# Patient Record
Sex: FEMALE | Race: WHITE | NOT HISPANIC OR LATINO | Employment: FULL TIME | URBAN - METROPOLITAN AREA
[De-identification: names, ages, dates, MRNs, and addresses within clinical notes are randomized per-mention and may not be internally consistent; named-entity substitution may affect disease eponyms.]

---

## 2017-02-01 ENCOUNTER — HISTORICAL (OUTPATIENT)
Dept: LAB | Facility: HOSPITAL | Age: 57
End: 2017-02-01

## 2017-10-10 ENCOUNTER — HISTORICAL (OUTPATIENT)
Dept: URGENT CARE | Facility: CLINIC | Age: 57
End: 2017-10-10

## 2017-10-10 LAB
BILIRUB SERPL-MCNC: NEGATIVE MG/DL
BLOOD URINE, POC: NEGATIVE
CLARITY, POC UA: CLEAR
COLOR, POC UA: YELLOW
GLUCOSE UR QL STRIP: NEGATIVE
KETONES UR QL STRIP: NEGATIVE
LEUKOCYTE EST, POC UA: NEGATIVE
NITRITE, POC UA: NEGATIVE
PH, POC UA: 5
PROTEIN, POC: NEGATIVE
SPECIFIC GRAVITY, POC UA: 1.01
UROBILINOGEN, POC UA: NORMAL

## 2017-10-12 LAB — FINAL CULTURE: NO GROWTH

## 2018-05-30 ENCOUNTER — HISTORICAL (OUTPATIENT)
Dept: LAB | Facility: HOSPITAL | Age: 58
End: 2018-05-30

## 2018-05-30 LAB
ABS NEUT (OLG): 5.05 X10(3)/MCL (ref 2.1–9.2)
ALBUMIN SERPL-MCNC: 4.1 GM/DL (ref 3.4–5)
ALBUMIN/GLOB SERPL: 1 RATIO (ref 1.1–2)
ALP SERPL-CCNC: 111 UNIT/L (ref 38–126)
ALT SERPL-CCNC: 35 UNIT/L (ref 12–78)
AST SERPL-CCNC: 23 UNIT/L (ref 15–37)
BASOPHILS # BLD AUTO: 0 X10(3)/MCL (ref 0–0.2)
BASOPHILS NFR BLD AUTO: 0 %
BILIRUB SERPL-MCNC: 0.5 MG/DL (ref 0.2–1)
BILIRUBIN DIRECT+TOT PNL SERPL-MCNC: 0.1 MG/DL (ref 0–0.5)
BILIRUBIN DIRECT+TOT PNL SERPL-MCNC: 0.4 MG/DL (ref 0–0.8)
BUN SERPL-MCNC: 17 MG/DL (ref 7–18)
CALCIUM SERPL-MCNC: 9.7 MG/DL (ref 8.5–10.1)
CHLORIDE SERPL-SCNC: 104 MMOL/L (ref 98–107)
CO2 SERPL-SCNC: 30 MMOL/L (ref 21–32)
CREAT SERPL-MCNC: 0.85 MG/DL (ref 0.55–1.02)
CRP SERPL HS-MCNC: 2.42 MG/L (ref 0–3)
EOSINOPHIL # BLD AUTO: 0.1 X10(3)/MCL (ref 0–0.9)
EOSINOPHIL NFR BLD AUTO: 1 %
ERYTHROCYTE [DISTWIDTH] IN BLOOD BY AUTOMATED COUNT: 12.8 % (ref 11.5–17)
ERYTHROCYTE [SEDIMENTATION RATE] IN BLOOD: 25 MM/HR (ref 0–20)
GLOBULIN SER-MCNC: 4 GM/DL (ref 2.4–3.5)
GLUCOSE SERPL-MCNC: 91 MG/DL (ref 74–106)
HCT VFR BLD AUTO: 43.6 % (ref 37–47)
HGB BLD-MCNC: 14.2 GM/DL (ref 12–16)
LYMPHOCYTES # BLD AUTO: 2.8 X10(3)/MCL (ref 0.6–4.6)
LYMPHOCYTES NFR BLD AUTO: 33 %
MCH RBC QN AUTO: 29.5 PG (ref 27–31)
MCHC RBC AUTO-ENTMCNC: 32.6 GM/DL (ref 33–36)
MCV RBC AUTO: 90.5 FL (ref 80–94)
MONOCYTES # BLD AUTO: 0.6 X10(3)/MCL (ref 0.1–1.3)
MONOCYTES NFR BLD AUTO: 7 %
NEUTROPHILS # BLD AUTO: 5.05 X10(3)/MCL (ref 1.4–7.9)
NEUTROPHILS NFR BLD AUTO: 59 %
PLATELET # BLD AUTO: 338 X10(3)/MCL (ref 130–400)
PMV BLD AUTO: 10.7 FL (ref 9.4–12.4)
POTASSIUM SERPL-SCNC: 4.3 MMOL/L (ref 3.5–5.1)
PROT SERPL-MCNC: 8.1 GM/DL (ref 6.4–8.2)
RBC # BLD AUTO: 4.82 X10(6)/MCL (ref 4.2–5.4)
SODIUM SERPL-SCNC: 139 MMOL/L (ref 136–145)
TSH SERPL-ACNC: 2.71 MIU/L (ref 0.36–3.74)
WBC # SPEC AUTO: 8.5 X10(3)/MCL (ref 4.5–11.5)

## 2018-06-28 ENCOUNTER — HISTORICAL (OUTPATIENT)
Dept: RADIOLOGY | Facility: HOSPITAL | Age: 58
End: 2018-06-28

## 2018-09-04 ENCOUNTER — HISTORICAL (OUTPATIENT)
Dept: LAB | Facility: HOSPITAL | Age: 58
End: 2018-09-04

## 2018-09-04 LAB
CHOLEST SERPL-MCNC: 220 MG/DL (ref 0–200)
CHOLEST/HDLC SERPL: 3.1 {RATIO} (ref 0–4)
GLUCOSE P FAST SERPL-MCNC: 80 MG/DL (ref 70–115)
HDLC SERPL-MCNC: 72 MG/DL (ref 35–60)
LDLC SERPL CALC-MCNC: 126 MG/DL (ref 0–129)
TRIGL SERPL-MCNC: 109 MG/DL (ref 30–150)
VLDLC SERPL CALC-MCNC: 22 MG/DL

## 2021-06-09 ENCOUNTER — HISTORICAL (OUTPATIENT)
Dept: LAB | Facility: HOSPITAL | Age: 61
End: 2021-06-09

## 2021-06-09 LAB
APPEARANCE, UA: ABNORMAL
BACTERIA SPEC CULT: ABNORMAL /HPF
BILIRUB UR QL STRIP: NEGATIVE
CHOLEST SERPL-MCNC: 170 MG/DL
CHOLEST/HDLC SERPL: 3 {RATIO} (ref 0–5)
COLOR UR: YELLOW
GLUCOSE (UA): NEGATIVE
HDLC SERPL-MCNC: 51 MG/DL (ref 40–60)
HGB UR QL STRIP: NEGATIVE
KETONES UR QL STRIP: NEGATIVE
LDLC SERPL CALC-MCNC: 98 MG/DL (ref 50–140)
LEUKOCYTE ESTERASE UR QL STRIP: ABNORMAL
NITRITE UR QL STRIP: NEGATIVE
PH UR STRIP: 7 [PH] (ref 5–7)
PROT UR QL STRIP: NEGATIVE
RBC #/AREA URNS HPF: 0 /[HPF]
SP GR UR STRIP: 1.01 (ref 1–1.03)
SQUAMOUS EPITHELIAL, UA: ABNORMAL /LPF
TRIGL SERPL-MCNC: 105 MG/DL (ref 0–150)
TSH SERPL-ACNC: 2.34 UIU/ML (ref 0.35–4.94)
UROBILINOGEN UR STRIP-ACNC: NEGATIVE
VLDLC SERPL CALC-MCNC: 21 MG/DL
WBC #/AREA URNS HPF: ABNORMAL /HPF

## 2021-06-11 LAB — FINAL CULTURE: NO GROWTH

## 2021-06-22 ENCOUNTER — HISTORICAL (OUTPATIENT)
Dept: LAB | Facility: HOSPITAL | Age: 61
End: 2021-06-22

## 2021-06-22 LAB
EST. AVERAGE GLUCOSE BLD GHB EST-MCNC: 99.7 MG/DL
HBA1C MFR BLD: 5.1 %

## 2021-07-07 ENCOUNTER — HISTORICAL (OUTPATIENT)
Dept: RADIOLOGY | Facility: HOSPITAL | Age: 61
End: 2021-07-07

## 2022-01-13 LAB — NONINV COLON CA DNA+OCC BLD SCRN STL QL: NEGATIVE

## 2022-04-11 ENCOUNTER — HISTORICAL (OUTPATIENT)
Dept: ADMINISTRATIVE | Facility: HOSPITAL | Age: 62
End: 2022-04-11

## 2022-04-29 VITALS
BODY MASS INDEX: 37.45 KG/M2 | HEIGHT: 64 IN | OXYGEN SATURATION: 98 % | WEIGHT: 219.38 LBS | SYSTOLIC BLOOD PRESSURE: 118 MMHG | DIASTOLIC BLOOD PRESSURE: 80 MMHG

## 2022-06-01 ENCOUNTER — TELEPHONE (OUTPATIENT)
Dept: FAMILY MEDICINE | Facility: CLINIC | Age: 62
End: 2022-06-01

## 2022-06-01 NOTE — TELEPHONE ENCOUNTER
Spoke to pt. She states that she went fishing last Saturday and got sunburned on bilat legs from the knees on down. She states that the legs are still very red and that she does have some swelling. She states that she is on Methotrexate and Plaquenil. She is wondering if maybe she had a reaction to the sun with her meds. Does pt need to be seen? Please advise.

## 2022-06-01 NOTE — TELEPHONE ENCOUNTER
----- Message from Tyson Sunshine sent at 6/1/2022 11:38 AM CDT -----  .Type:  Needs Medical Advice    Who Called: Jaye  Symptoms (please be specific):    How long has patient had these symptoms:    Pharmacy name and phone #:    Would the patient rather a call back or a response via [facility]?   Best Call Back Number: [phone]  Additional Information: She has questions for the nurse, she has gotten sunburn, pain has gone away but the skin is really red. She would like a call back.

## 2022-06-02 NOTE — TELEPHONE ENCOUNTER
Pt notified of Dr Garvin's recommendations. Verbal understanding given. She will call back if she feels she needs an appt.

## 2022-06-02 NOTE — TELEPHONE ENCOUNTER
It is possible that her reaction was more severe due to being on those medications. Would recommend symptomatic treatment with cooling methods and aloe vera gel. If it worsen, would recommend patient schedule an office visit for further evaluation.

## 2022-06-22 ENCOUNTER — OFFICE VISIT (OUTPATIENT)
Dept: FAMILY MEDICINE | Facility: CLINIC | Age: 62
End: 2022-06-22
Payer: COMMERCIAL

## 2022-06-22 VITALS
DIASTOLIC BLOOD PRESSURE: 83 MMHG | WEIGHT: 218 LBS | OXYGEN SATURATION: 97 % | HEART RATE: 74 BPM | TEMPERATURE: 98 F | BODY MASS INDEX: 38.62 KG/M2 | HEIGHT: 63 IN | SYSTOLIC BLOOD PRESSURE: 126 MMHG | RESPIRATION RATE: 18 BRPM

## 2022-06-22 DIAGNOSIS — E78.2 MIXED HYPERLIPIDEMIA: ICD-10-CM

## 2022-06-22 DIAGNOSIS — I49.9 CARDIAC ARRHYTHMIA, UNSPECIFIED CARDIAC ARRHYTHMIA TYPE: ICD-10-CM

## 2022-06-22 DIAGNOSIS — Z00.00 ANNUAL PHYSICAL EXAM: Primary | ICD-10-CM

## 2022-06-22 DIAGNOSIS — Z12.31 ENCOUNTER FOR SCREENING MAMMOGRAM FOR MALIGNANT NEOPLASM OF BREAST: ICD-10-CM

## 2022-06-22 DIAGNOSIS — Z23 NEED FOR PNEUMOCOCCAL VACCINE: ICD-10-CM

## 2022-06-22 DIAGNOSIS — M79.89 SWELLING OF LOWER EXTREMITY: ICD-10-CM

## 2022-06-22 DIAGNOSIS — M19.90 OSTEOARTHRITIS, UNSPECIFIED OSTEOARTHRITIS TYPE, UNSPECIFIED SITE: ICD-10-CM

## 2022-06-22 DIAGNOSIS — I34.1 MITRAL VALVE PROLAPSE: ICD-10-CM

## 2022-06-22 DIAGNOSIS — M06.9 RHEUMATOID ARTHRITIS, INVOLVING UNSPECIFIED SITE, UNSPECIFIED WHETHER RHEUMATOID FACTOR PRESENT: ICD-10-CM

## 2022-06-22 PROCEDURE — 3008F PR BODY MASS INDEX (BMI) DOCUMENTED: ICD-10-PCS | Mod: CPTII,,, | Performed by: STUDENT IN AN ORGANIZED HEALTH CARE EDUCATION/TRAINING PROGRAM

## 2022-06-22 PROCEDURE — 99396 PR PREVENTIVE VISIT,EST,40-64: ICD-10-PCS | Mod: 25,,, | Performed by: STUDENT IN AN ORGANIZED HEALTH CARE EDUCATION/TRAINING PROGRAM

## 2022-06-22 PROCEDURE — 90677 PNEUMOCOCCAL CONJUGATE VACCINE 20-VALENT: ICD-10-PCS | Mod: ,,, | Performed by: STUDENT IN AN ORGANIZED HEALTH CARE EDUCATION/TRAINING PROGRAM

## 2022-06-22 PROCEDURE — 90677 PCV20 VACCINE IM: CPT | Mod: ,,, | Performed by: STUDENT IN AN ORGANIZED HEALTH CARE EDUCATION/TRAINING PROGRAM

## 2022-06-22 PROCEDURE — 1159F MED LIST DOCD IN RCRD: CPT | Mod: CPTII,,, | Performed by: STUDENT IN AN ORGANIZED HEALTH CARE EDUCATION/TRAINING PROGRAM

## 2022-06-22 PROCEDURE — 3074F PR MOST RECENT SYSTOLIC BLOOD PRESSURE < 130 MM HG: ICD-10-PCS | Mod: CPTII,,, | Performed by: STUDENT IN AN ORGANIZED HEALTH CARE EDUCATION/TRAINING PROGRAM

## 2022-06-22 PROCEDURE — 90471 PNEUMOCOCCAL CONJUGATE VACCINE 20-VALENT: ICD-10-PCS | Mod: ,,, | Performed by: STUDENT IN AN ORGANIZED HEALTH CARE EDUCATION/TRAINING PROGRAM

## 2022-06-22 PROCEDURE — 3079F PR MOST RECENT DIASTOLIC BLOOD PRESSURE 80-89 MM HG: ICD-10-PCS | Mod: CPTII,,, | Performed by: STUDENT IN AN ORGANIZED HEALTH CARE EDUCATION/TRAINING PROGRAM

## 2022-06-22 PROCEDURE — 3074F SYST BP LT 130 MM HG: CPT | Mod: CPTII,,, | Performed by: STUDENT IN AN ORGANIZED HEALTH CARE EDUCATION/TRAINING PROGRAM

## 2022-06-22 PROCEDURE — 1159F PR MEDICATION LIST DOCUMENTED IN MEDICAL RECORD: ICD-10-PCS | Mod: CPTII,,, | Performed by: STUDENT IN AN ORGANIZED HEALTH CARE EDUCATION/TRAINING PROGRAM

## 2022-06-22 PROCEDURE — 90471 IMMUNIZATION ADMIN: CPT | Mod: ,,, | Performed by: STUDENT IN AN ORGANIZED HEALTH CARE EDUCATION/TRAINING PROGRAM

## 2022-06-22 PROCEDURE — 3079F DIAST BP 80-89 MM HG: CPT | Mod: CPTII,,, | Performed by: STUDENT IN AN ORGANIZED HEALTH CARE EDUCATION/TRAINING PROGRAM

## 2022-06-22 PROCEDURE — 3008F BODY MASS INDEX DOCD: CPT | Mod: CPTII,,, | Performed by: STUDENT IN AN ORGANIZED HEALTH CARE EDUCATION/TRAINING PROGRAM

## 2022-06-22 PROCEDURE — 99396 PREV VISIT EST AGE 40-64: CPT | Mod: 25,,, | Performed by: STUDENT IN AN ORGANIZED HEALTH CARE EDUCATION/TRAINING PROGRAM

## 2022-06-22 RX ORDER — MELOXICAM 15 MG/1
15 TABLET ORAL DAILY
COMMUNITY
Start: 2021-06-03 | End: 2023-06-26

## 2022-06-22 RX ORDER — FOLIC ACID 1 MG/1
1000 TABLET ORAL DAILY
COMMUNITY
Start: 2022-04-14

## 2022-06-22 RX ORDER — HYDROCHLOROTHIAZIDE 25 MG/1
25 TABLET ORAL DAILY
COMMUNITY

## 2022-06-22 RX ORDER — HYDROXYCHLOROQUINE SULFATE 200 MG/1
200 TABLET, FILM COATED ORAL 2 TIMES DAILY
COMMUNITY
Start: 2022-05-19

## 2022-06-22 RX ORDER — OMEPRAZOLE 20 MG/1
20 CAPSULE, DELAYED RELEASE ORAL EVERY MORNING
COMMUNITY
Start: 2022-03-07

## 2022-06-22 RX ORDER — METOPROLOL SUCCINATE 100 MG/1
100 TABLET, EXTENDED RELEASE ORAL DAILY
COMMUNITY
Start: 2022-04-14

## 2022-06-22 RX ORDER — METHOTREXATE 25 MG/ML
0.8 INJECTION, SOLUTION INTRA-ARTERIAL; INTRAMUSCULAR; INTRAVENOUS
COMMUNITY
Start: 2022-05-28

## 2022-06-22 NOTE — ASSESSMENT & PLAN NOTE
- Patient following with Dr. Jenny Rice with Rheumatology.  - Patient taking folic acid, Plaquenil, Mobic PRN, Methotrexate weekly, and Prednisone per Rheumatology. She takes Prednisone when she has any flare-ups.

## 2022-06-22 NOTE — PROGRESS NOTES
"Subjective:      Patient ID: Jaye Tinajero is a 62 y.o.  female.    Chief Complaint: Wellness    Preventative Health: Pap smear from 06/22/21 negative. Mammogram from 07/07/21 negative. Cologuard from 01/04/22 negative.    RA/OA: Patient following with Dr. Jenny Rice with Rheumatology. Patient taking folic acid, Plaquenil, Mobic PRN, Methotrexate weekly, and Prednisone per Rheumatology. She takes Prednisone when she has any flare-ups.    Mitral Valve Prolapse/Arrhythmia: Patient following Dr. Chemo Barraza with Cardiology. She is taking Metoprolol daily. Patient denies any chest pain, shortness of breath, or any syncopal episodes. She endorses palpitations, "here and there," but states she's been doing well overall.    Lower Extremity Swelling: Patient takes HCTZ PRN, maybe 1-2 times a month.    Review of Systems   Constitutional: Negative for activity change, fatigue and fever.   Respiratory: Negative for apnea, cough and shortness of breath.    Cardiovascular: Positive for palpitations and leg swelling. Negative for chest pain.   Gastrointestinal: Negative for abdominal pain, diarrhea and nausea.   Musculoskeletal: Positive for arthralgias.   Skin: Negative for rash and wound.   Neurological: Negative for dizziness, weakness, numbness and headaches.   Psychiatric/Behavioral: Negative for behavioral problems.     Objective:   /83 (BP Location: Right arm)   Pulse 74   Temp 98.1 °F (36.7 °C)   Resp 18   Ht 5' 3" (1.6 m)   Wt 98.9 kg (218 lb)   SpO2 97%   BMI 38.62 kg/m²     Physical Exam  Vitals and nursing note reviewed.   Constitutional:       General: She is not in acute distress.     Appearance: Normal appearance. She is not ill-appearing or toxic-appearing.   HENT:      Head: Normocephalic and atraumatic.      Mouth/Throat:      Mouth: Mucous membranes are moist.      Pharynx: Oropharynx is clear.   Cardiovascular:      Rate and Rhythm: Normal rate and regular rhythm.      Heart sounds: " "Normal heart sounds. No murmur heard.  Pulmonary:      Effort: Pulmonary effort is normal. No respiratory distress.      Breath sounds: Normal breath sounds.   Abdominal:      General: There is no distension.      Palpations: Abdomen is soft.      Tenderness: There is no abdominal tenderness.   Musculoskeletal:         General: Normal range of motion.      Right lower leg: No edema.      Left lower leg: No edema.   Skin:     General: Skin is warm and dry.      Findings: No lesion or rash.   Neurological:      General: No focal deficit present.      Mental Status: She is alert. Mental status is at baseline.   Psychiatric:         Mood and Affect: Mood normal.         Behavior: Behavior normal.         Thought Content: Thought content normal.         Judgment: Judgment normal.       Assessment:     1. Annual physical exam    2. Need for pneumococcal vaccine    3. Encounter for screening mammogram for malignant neoplasm of breast    4. Rheumatoid arthritis, involving unspecified site, unspecified whether rheumatoid factor present    5. Osteoarthritis, unspecified osteoarthritis type, unspecified site    6. Mitral valve prolapse    7. Cardiac arrhythmia, unspecified cardiac arrhythmia type    8. Swelling of lower extremity    9. Mixed hyperlipidemia      Plan:     Problem List Items Addressed This Visit        Cardiac/Vascular    Cardiac arrhythmia     - Refer to Mitral valve prolapse plan.             Mitral valve prolapse     - Patient following Dr. Chemo Barraza with Cardiology.  - She is taking Metoprolol daily.  - Patient denies any chest pain, shortness of breath, or any syncopal episodes.  - She endorses palpitations, "here and there," but states she's been doing well overall.             Mixed hyperlipidemia     - Lipid panel for routine monitoring.           Relevant Orders    Lipid Panel       Immunology/Multi System    Rheumatoid arthritis     - Patient following with Dr. Jenny Rice with Rheumatology.  - " Patient taking folic acid, Plaquenil, Mobic PRN, Methotrexate weekly, and Prednisone per Rheumatology. She takes Prednisone when she has any flare-ups.                Orthopedic    Osteoarthritis     - Refer to RA plan.              Other    Swelling of lower extremity     - Patient takes HCTZ PRN, maybe 1-2 times a month.               Other Visit Diagnoses     Annual physical exam    -  Primary    Relevant Orders    Pneumococcal Conjugate Vaccine (20 Valent) (IM)    Mammo Digital Screening Bilat    Need for pneumococcal vaccine        Relevant Orders    Pneumococcal Conjugate Vaccine (20 Valent) (IM)    Encounter for screening mammogram for malignant neoplasm of breast        Relevant Orders    Mammo Digital Screening Bilat

## 2022-06-22 NOTE — ASSESSMENT & PLAN NOTE
"- Patient following Dr. Chemo Barraza with Cardiology.  - She is taking Metoprolol daily.  - Patient denies any chest pain, shortness of breath, or any syncopal episodes.  - She endorses palpitations, "here and there," but states she's been doing well overall.    "

## 2022-06-29 ENCOUNTER — DOCUMENTATION ONLY (OUTPATIENT)
Dept: ADMINISTRATIVE | Facility: HOSPITAL | Age: 62
End: 2022-06-29
Payer: COMMERCIAL

## 2022-07-27 ENCOUNTER — LAB VISIT (OUTPATIENT)
Dept: LAB | Facility: HOSPITAL | Age: 62
End: 2022-07-27
Attending: STUDENT IN AN ORGANIZED HEALTH CARE EDUCATION/TRAINING PROGRAM
Payer: COMMERCIAL

## 2022-07-27 DIAGNOSIS — E78.2 MIXED HYPERLIPIDEMIA: ICD-10-CM

## 2022-07-27 LAB
CHOLEST SERPL-MCNC: 178 MG/DL
CHOLEST/HDLC SERPL: 3 {RATIO} (ref 0–5)
HDLC SERPL-MCNC: 58 MG/DL (ref 35–60)
LDLC SERPL CALC-MCNC: 104 MG/DL (ref 50–140)
TRIGL SERPL-MCNC: 81 MG/DL (ref 37–140)
VLDLC SERPL CALC-MCNC: 16 MG/DL

## 2022-07-27 PROCEDURE — 36415 COLL VENOUS BLD VENIPUNCTURE: CPT

## 2022-07-27 PROCEDURE — 80061 LIPID PANEL: CPT

## 2022-07-28 ENCOUNTER — TELEPHONE (OUTPATIENT)
Dept: FAMILY MEDICINE | Facility: CLINIC | Age: 62
End: 2022-07-28
Payer: COMMERCIAL

## 2022-09-20 ENCOUNTER — HOSPITAL ENCOUNTER (OUTPATIENT)
Dept: RADIOLOGY | Facility: HOSPITAL | Age: 62
Discharge: HOME | End: 2022-09-20
Attending: STUDENT IN AN ORGANIZED HEALTH CARE EDUCATION/TRAINING PROGRAM
Payer: COMMERCIAL

## 2022-09-20 DIAGNOSIS — Z00.00 ANNUAL PHYSICAL EXAM: ICD-10-CM

## 2022-09-20 DIAGNOSIS — Z12.31 ENCOUNTER FOR SCREENING MAMMOGRAM FOR MALIGNANT NEOPLASM OF BREAST: ICD-10-CM

## 2022-09-20 PROCEDURE — 77067 SCR MAMMO BI INCL CAD: CPT | Mod: 26,,, | Performed by: STUDENT IN AN ORGANIZED HEALTH CARE EDUCATION/TRAINING PROGRAM

## 2022-09-20 PROCEDURE — 77067 SCR MAMMO BI INCL CAD: CPT | Mod: TC

## 2022-09-20 PROCEDURE — 77067 MAMMO DIGITAL SCREENING BILAT WITH TOMO: ICD-10-PCS | Mod: 26,,, | Performed by: STUDENT IN AN ORGANIZED HEALTH CARE EDUCATION/TRAINING PROGRAM

## 2022-09-20 PROCEDURE — 77063 MAMMO DIGITAL SCREENING BILAT WITH TOMO: ICD-10-PCS | Mod: 26,,, | Performed by: STUDENT IN AN ORGANIZED HEALTH CARE EDUCATION/TRAINING PROGRAM

## 2022-09-20 PROCEDURE — 77063 BREAST TOMOSYNTHESIS BI: CPT | Mod: 26,,, | Performed by: STUDENT IN AN ORGANIZED HEALTH CARE EDUCATION/TRAINING PROGRAM

## 2022-09-22 ENCOUNTER — HISTORICAL (OUTPATIENT)
Dept: ADMINISTRATIVE | Facility: HOSPITAL | Age: 62
End: 2022-09-22
Payer: COMMERCIAL

## 2022-09-26 ENCOUNTER — TELEPHONE (OUTPATIENT)
Dept: FAMILY MEDICINE | Facility: CLINIC | Age: 62
End: 2022-09-26
Payer: COMMERCIAL

## 2022-09-26 NOTE — TELEPHONE ENCOUNTER
----- Message from Violetta Mile sent at 9/26/2022  8:53 AM CDT -----  Regarding: call back  .Type:  Needs Medical Advice    Who Called: pt   Symptoms (please be specific): \   How long has patient had these symptoms:  \  Pharmacy name and phone #:    Would the patient rather a call back or a response via [facility]?   Best Call Back Number: [phone]  Additional Information: pt twisted her knee and would like to know if she needs to go to a walk in clinic or if she can get an x-ray done in the clinic.

## 2022-09-26 NOTE — TELEPHONE ENCOUNTER
Called pt to find out which knee it was. She states it was the left but also states that she was going to go to [facility] urgent care. I advised her to have urgent care send the visit note to our office. Verbal understanding given.

## 2023-06-22 ENCOUNTER — TELEPHONE (OUTPATIENT)
Dept: FAMILY MEDICINE | Facility: CLINIC | Age: 63
End: 2023-06-22
Payer: COMMERCIAL

## 2023-06-22 DIAGNOSIS — E78.2 MIXED HYPERLIPIDEMIA: Primary | ICD-10-CM

## 2023-06-22 DIAGNOSIS — Z00.00 ANNUAL PHYSICAL EXAM: ICD-10-CM

## 2023-06-22 DIAGNOSIS — Z11.59 ENCOUNTER FOR HEPATITIS C SCREENING TEST FOR LOW RISK PATIENT: ICD-10-CM

## 2023-06-22 DIAGNOSIS — Z11.4 ENCOUNTER FOR SCREENING FOR HIV: ICD-10-CM

## 2023-06-22 NOTE — TELEPHONE ENCOUNTER
----- Message from Salome Amato MA sent at 6/22/2023  2:17 PM CDT -----  Regarding: FW: lab orders  Please advise  ----- Message -----  From: aDra Loya  Sent: 6/22/2023   2:14 PM CDT  To: [facility] Staff  Subject: lab orders                                       .Caller is requesting to schedule their Lab appointment prior to annual appointment.  Order is not listed in EPIC.  Please enter order and contact patient to schedule.    Name of Caller: patient    Preferred Date and Time of Labs:    Date of [facility] Appointment: 6/26    Where would they like the lab performed?    Would the patient rather a call back or a response via [facility]?  Call back    Best Call Back Number: [phone]    Additional Information: patient would like lab orders to be placed in system for upcoming wellness on 6/26. Please contact patient in regards to if the labs can be done same day or 2-3 days before.

## 2023-06-22 NOTE — TELEPHONE ENCOUNTER
I have ordered the following labs. Please notify the patient.    Orders Placed This Encounter   Procedures    CBC Without Differential     Standing Status:   Future     Standing Expiration Date:   8/20/2024    HIV 1/2 Ag/Ab (4th Gen)     Standing Status:   Future     Standing Expiration Date:   8/20/2024     Order Specific Question:   Release to patient     Answer:   Immediate    Hepatitis C Antibody     Standing Status:   Future     Standing Expiration Date:   8/20/2024     Order Specific Question:   Release to patient     Answer:   Immediate    Lipid Panel     Standing Status:   Future     Standing Expiration Date:   9/22/2023    Basic Metabolic Panel     Standing Status:   Future     Standing Expiration Date:   9/22/2023

## 2023-06-23 ENCOUNTER — LAB VISIT (OUTPATIENT)
Dept: LAB | Facility: HOSPITAL | Age: 63
End: 2023-06-23
Attending: STUDENT IN AN ORGANIZED HEALTH CARE EDUCATION/TRAINING PROGRAM
Payer: COMMERCIAL

## 2023-06-23 DIAGNOSIS — Z11.59 ENCOUNTER FOR HEPATITIS C SCREENING TEST FOR LOW RISK PATIENT: ICD-10-CM

## 2023-06-23 DIAGNOSIS — E78.2 MIXED HYPERLIPIDEMIA: ICD-10-CM

## 2023-06-23 DIAGNOSIS — Z11.4 ENCOUNTER FOR SCREENING FOR HIV: ICD-10-CM

## 2023-06-23 DIAGNOSIS — Z00.00 ANNUAL PHYSICAL EXAM: ICD-10-CM

## 2023-06-23 LAB
ANION GAP SERPL CALC-SCNC: 8 MEQ/L
BUN SERPL-MCNC: 13.2 MG/DL (ref 9.8–20.1)
CALCIUM SERPL-MCNC: 9.9 MG/DL (ref 8.4–10.2)
CHLORIDE SERPL-SCNC: 110 MMOL/L (ref 98–107)
CHOLEST SERPL-MCNC: 159 MG/DL
CHOLEST/HDLC SERPL: 3 {RATIO} (ref 0–5)
CO2 SERPL-SCNC: 25 MMOL/L (ref 23–31)
CREAT SERPL-MCNC: 0.78 MG/DL (ref 0.55–1.02)
CREAT/UREA NIT SERPL: 17
ERYTHROCYTE [DISTWIDTH] IN BLOOD BY AUTOMATED COUNT: 13.2 % (ref 11.5–17)
GFR SERPLBLD CREATININE-BSD FMLA CKD-EPI: >60 MLS/MIN/1.73/M2
GLUCOSE SERPL-MCNC: 95 MG/DL (ref 82–115)
HCT VFR BLD AUTO: 39.7 % (ref 37–47)
HCV AB SERPL QL IA: NONREACTIVE
HDLC SERPL-MCNC: 48 MG/DL (ref 35–60)
HGB BLD-MCNC: 13.3 G/DL (ref 12–16)
HIV 1+2 AB+HIV1 P24 AG SERPL QL IA: NONREACTIVE
LDLC SERPL CALC-MCNC: 93 MG/DL (ref 50–140)
MCH RBC QN AUTO: 31.2 PG (ref 27–31)
MCHC RBC AUTO-ENTMCNC: 33.5 G/DL (ref 33–36)
MCV RBC AUTO: 93.2 FL (ref 80–94)
NRBC BLD AUTO-RTO: 0 %
PLATELET # BLD AUTO: 272 X10(3)/MCL (ref 130–400)
PMV BLD AUTO: 10.1 FL (ref 7.4–10.4)
POTASSIUM SERPL-SCNC: 3.8 MMOL/L (ref 3.5–5.1)
RBC # BLD AUTO: 4.26 X10(6)/MCL (ref 4.2–5.4)
SODIUM SERPL-SCNC: 143 MMOL/L (ref 136–145)
TRIGL SERPL-MCNC: 89 MG/DL (ref 37–140)
VLDLC SERPL CALC-MCNC: 18 MG/DL
WBC # SPEC AUTO: 5.87 X10(3)/MCL (ref 4.5–11.5)

## 2023-06-23 PROCEDURE — 36415 COLL VENOUS BLD VENIPUNCTURE: CPT

## 2023-06-23 PROCEDURE — 80061 LIPID PANEL: CPT

## 2023-06-23 PROCEDURE — 87389 HIV-1 AG W/HIV-1&-2 AB AG IA: CPT

## 2023-06-23 PROCEDURE — 86803 HEPATITIS C AB TEST: CPT

## 2023-06-23 PROCEDURE — 85027 COMPLETE CBC AUTOMATED: CPT

## 2023-06-23 PROCEDURE — 80048 BASIC METABOLIC PNL TOTAL CA: CPT

## 2023-06-26 ENCOUNTER — OFFICE VISIT (OUTPATIENT)
Dept: FAMILY MEDICINE | Facility: CLINIC | Age: 63
End: 2023-06-26
Payer: COMMERCIAL

## 2023-06-26 ENCOUNTER — LAB VISIT (OUTPATIENT)
Dept: LAB | Facility: HOSPITAL | Age: 63
End: 2023-06-26
Attending: STUDENT IN AN ORGANIZED HEALTH CARE EDUCATION/TRAINING PROGRAM
Payer: COMMERCIAL

## 2023-06-26 VITALS
HEART RATE: 66 BPM | OXYGEN SATURATION: 96 % | HEIGHT: 63 IN | BODY MASS INDEX: 38.15 KG/M2 | RESPIRATION RATE: 18 BRPM | SYSTOLIC BLOOD PRESSURE: 116 MMHG | WEIGHT: 215.31 LBS | DIASTOLIC BLOOD PRESSURE: 78 MMHG | TEMPERATURE: 98 F

## 2023-06-26 DIAGNOSIS — I34.1 MITRAL VALVE PROLAPSE: ICD-10-CM

## 2023-06-26 DIAGNOSIS — I49.9 CARDIAC ARRHYTHMIA, UNSPECIFIED CARDIAC ARRHYTHMIA TYPE: ICD-10-CM

## 2023-06-26 DIAGNOSIS — R53.83 FATIGUE, UNSPECIFIED TYPE: ICD-10-CM

## 2023-06-26 DIAGNOSIS — M79.89 SWELLING OF LOWER EXTREMITY: ICD-10-CM

## 2023-06-26 DIAGNOSIS — L40.50 PSORIATIC ARTHRITIS: ICD-10-CM

## 2023-06-26 DIAGNOSIS — Z00.00 ANNUAL PHYSICAL EXAM: Primary | ICD-10-CM

## 2023-06-26 DIAGNOSIS — E78.2 MIXED HYPERLIPIDEMIA: ICD-10-CM

## 2023-06-26 DIAGNOSIS — M06.9 RHEUMATOID ARTHRITIS, INVOLVING UNSPECIFIED SITE, UNSPECIFIED WHETHER RHEUMATOID FACTOR PRESENT: ICD-10-CM

## 2023-06-26 DIAGNOSIS — M19.90 OSTEOARTHRITIS, UNSPECIFIED OSTEOARTHRITIS TYPE, UNSPECIFIED SITE: ICD-10-CM

## 2023-06-26 DIAGNOSIS — E66.01 SEVERE OBESITY (BMI 35.0-39.9) WITH COMORBIDITY: ICD-10-CM

## 2023-06-26 PROBLEM — I47.10 SUPRAVENTRICULAR TACHYCARDIA: Status: RESOLVED | Noted: 2023-06-26 | Resolved: 2023-06-26

## 2023-06-26 PROBLEM — I47.10 SUPRAVENTRICULAR TACHYCARDIA: Status: ACTIVE | Noted: 2023-06-26

## 2023-06-26 LAB
DEPRECATED CALCIDIOL+CALCIFEROL SERPL-MC: 44.2 NG/ML (ref 30–80)
FOLATE SERPL-MCNC: 60.4 NG/ML (ref 7–31.4)
TSH SERPL-ACNC: 2.33 UIU/ML (ref 0.35–4.94)
VIT B12 SERPL-MCNC: 686 PG/ML (ref 213–816)

## 2023-06-26 PROCEDURE — 1159F MED LIST DOCD IN RCRD: CPT | Mod: CPTII,,, | Performed by: STUDENT IN AN ORGANIZED HEALTH CARE EDUCATION/TRAINING PROGRAM

## 2023-06-26 PROCEDURE — 99396 PR PREVENTIVE VISIT,EST,40-64: ICD-10-PCS | Mod: ,,, | Performed by: STUDENT IN AN ORGANIZED HEALTH CARE EDUCATION/TRAINING PROGRAM

## 2023-06-26 PROCEDURE — 3008F PR BODY MASS INDEX (BMI) DOCUMENTED: ICD-10-PCS | Mod: CPTII,,, | Performed by: STUDENT IN AN ORGANIZED HEALTH CARE EDUCATION/TRAINING PROGRAM

## 2023-06-26 PROCEDURE — 36415 COLL VENOUS BLD VENIPUNCTURE: CPT

## 2023-06-26 PROCEDURE — 82306 VITAMIN D 25 HYDROXY: CPT

## 2023-06-26 PROCEDURE — 3074F PR MOST RECENT SYSTOLIC BLOOD PRESSURE < 130 MM HG: ICD-10-PCS | Mod: CPTII,,, | Performed by: STUDENT IN AN ORGANIZED HEALTH CARE EDUCATION/TRAINING PROGRAM

## 2023-06-26 PROCEDURE — 3078F PR MOST RECENT DIASTOLIC BLOOD PRESSURE < 80 MM HG: ICD-10-PCS | Mod: CPTII,,, | Performed by: STUDENT IN AN ORGANIZED HEALTH CARE EDUCATION/TRAINING PROGRAM

## 2023-06-26 PROCEDURE — 82746 ASSAY OF FOLIC ACID SERUM: CPT

## 2023-06-26 PROCEDURE — 84443 ASSAY THYROID STIM HORMONE: CPT

## 2023-06-26 PROCEDURE — 1159F PR MEDICATION LIST DOCUMENTED IN MEDICAL RECORD: ICD-10-PCS | Mod: CPTII,,, | Performed by: STUDENT IN AN ORGANIZED HEALTH CARE EDUCATION/TRAINING PROGRAM

## 2023-06-26 PROCEDURE — 3008F BODY MASS INDEX DOCD: CPT | Mod: CPTII,,, | Performed by: STUDENT IN AN ORGANIZED HEALTH CARE EDUCATION/TRAINING PROGRAM

## 2023-06-26 PROCEDURE — 3078F DIAST BP <80 MM HG: CPT | Mod: CPTII,,, | Performed by: STUDENT IN AN ORGANIZED HEALTH CARE EDUCATION/TRAINING PROGRAM

## 2023-06-26 PROCEDURE — 82607 VITAMIN B-12: CPT

## 2023-06-26 PROCEDURE — 99396 PREV VISIT EST AGE 40-64: CPT | Mod: ,,, | Performed by: STUDENT IN AN ORGANIZED HEALTH CARE EDUCATION/TRAINING PROGRAM

## 2023-06-26 PROCEDURE — 3074F SYST BP LT 130 MM HG: CPT | Mod: CPTII,,, | Performed by: STUDENT IN AN ORGANIZED HEALTH CARE EDUCATION/TRAINING PROGRAM

## 2023-06-26 NOTE — ASSESSMENT & PLAN NOTE
"- Patient following Dr. Chemo Barraza with Cardiology yearly.  - She is taking Metoprolol daily.  - Patient denies any chest pain, shortness of breath, or any syncopal episodes.  - She endorses palpitations, "here and there," but states she's been doing well overall.    "

## 2023-06-26 NOTE — PROGRESS NOTES
"Subjective:      Patient ID: Jaye Tinajero is a 63 y.o.  female.    Chief Complaint: Wellness    Preventative Health: CBC overall negative. BMP overall negative. Lipid panel negative. Hepatitis C Antibody negative. HIV negative. Pap smear UTD. Mammogram negative from 09/20/22. Cologuard negative from 01/04/22.    RA/Psoriatic Arthritis/OA: Patient following with Dr. Jenny Rice with Rheumatology. Patient taking folic acid, Plaquenil, and Methotrexate weekly. She was just started on Skyrizi as well.    Mitral Valve Prolapse/Arrhythmia: Patient following Dr. Chemo Barraza with Cardiology yearly. She is taking Metoprolol daily. Patient denies any chest pain, shortness of breath, or any syncopal episodes. She endorses palpitations, "here and there," but states she's been doing well overall.    Lower Extremity Swelling: Patient takes HCTZ PRN.    Fatigue: This is ongoing, but patient states this year it's just gotten worse.    Review of Systems   Constitutional:  Positive for fatigue. Negative for activity change, appetite change, fever and unexpected weight change.   Eyes:  Negative for visual disturbance.   Respiratory:  Negative for apnea, cough and shortness of breath.    Cardiovascular:  Positive for palpitations and leg swelling. Negative for chest pain.   Gastrointestinal:  Negative for abdominal pain, blood in stool, constipation, diarrhea, nausea and vomiting.   Genitourinary:  Negative for dysuria and hematuria.   Musculoskeletal:  Positive for arthralgias and joint swelling. Negative for back pain and myalgias.   Skin:  Negative for rash and wound.   Neurological:  Negative for dizziness, weakness, numbness and headaches.   Psychiatric/Behavioral:  Negative for behavioral problems, dysphoric mood and sleep disturbance. The patient is not nervous/anxious.      Objective:   /78 (BP Location: Right arm, Patient Position: Sitting, BP Method: Large (Automatic))   Pulse 66   Temp 98.2 °F (36.8 °C) " "(Temporal)   Resp 18   Ht 5' 3" (1.6 m)   Wt 97.7 kg (215 lb 4.8 oz)   SpO2 96%   BMI 38.14 kg/m²     Physical Exam  Vitals and nursing note reviewed.   Constitutional:       General: She is not in acute distress.     Appearance: Normal appearance. She is not ill-appearing or toxic-appearing.   HENT:      Head: Normocephalic and atraumatic.      Mouth/Throat:      Mouth: Mucous membranes are moist.      Pharynx: Oropharynx is clear.   Eyes:      Conjunctiva/sclera: Conjunctivae normal.   Cardiovascular:      Rate and Rhythm: Normal rate and regular rhythm.      Heart sounds: Normal heart sounds. No murmur heard.  Pulmonary:      Effort: Pulmonary effort is normal. No respiratory distress.      Breath sounds: Normal breath sounds.   Abdominal:      General: There is no distension.      Palpations: Abdomen is soft.      Tenderness: There is no abdominal tenderness.   Musculoskeletal:         General: Swelling (bilateral fingers) present. No tenderness, deformity or signs of injury. Normal range of motion.      Cervical back: Neck supple. No tenderness.      Right lower leg: No edema.      Left lower leg: No edema.   Lymphadenopathy:      Cervical: No cervical adenopathy.   Skin:     General: Skin is warm and dry.      Findings: No lesion or rash.   Neurological:      General: No focal deficit present.      Mental Status: She is alert. Mental status is at baseline.      Motor: No weakness.      Coordination: Coordination normal.   Psychiatric:         Mood and Affect: Mood normal.         Behavior: Behavior normal.         Thought Content: Thought content normal.         Judgment: Judgment normal.     Assessment/Plan:   1. Annual physical exam  Comments:  - Lab results reviewed with patient.  - Pap smear UTD.   - Mammogram negative from 09/20/22.   - Cologuard negative from 01/04/22.    2. Rheumatoid arthritis, involving unspecified site, unspecified whether rheumatoid factor present  Assessment & Plan:  - Patient " "following with Dr. Jenny Rice with Rheumatology.  - Patient taking folic acid, Plaquenil, and Methotrexate weekly. She was just started on Skyrizi as well.        3. Psoriatic arthritis  Assessment & Plan:  - Refer to RA plan.      4. Osteoarthritis, unspecified osteoarthritis type, unspecified site  Assessment & Plan:  - Refer to RA plan.      5. Mitral valve prolapse  Assessment & Plan:  - Patient following Dr. Chemo Barraza with Cardiology yearly.  - She is taking Metoprolol daily.  - Patient denies any chest pain, shortness of breath, or any syncopal episodes.  - She endorses palpitations, "here and there," but states she's been doing well overall.        6. Cardiac arrhythmia, unspecified cardiac arrhythmia type  Comments:  - Refer to Mitral valve prolapse plan.    7. Swelling of lower extremity  Assessment & Plan:  - Patient takes HCTZ PRN.        8. Mixed hyperlipidemia  Assessment & Plan:  - Lipid panel negative.      9. Severe obesity (BMI 35.0-39.9) with comorbidity  Assessment & Plan:  - Patient counseled regarding lifestyle modifications with diet and exercise.        10. Fatigue, unspecified type  -     TSH; Future; Expected date: 06/26/2023  -     Vitamin D; Future; Expected date: 06/26/2023  -     Vitamin B12; Future; Expected date: 06/26/2023  -     Folate; Future; Expected date: 06/26/2023       Follow up in about 1 year (around 6/26/2024) for Wellness.                "

## 2023-06-26 NOTE — ASSESSMENT & PLAN NOTE
- Patient following with Dr. Jenny Rice with Rheumatology.  - Patient taking folic acid, Plaquenil, and Methotrexate weekly. She was just started on Skyrizi as well.

## 2024-11-04 ENCOUNTER — TELEPHONE (OUTPATIENT)
Dept: FAMILY MEDICINE | Facility: CLINIC | Age: 64
End: 2024-11-04
Payer: MEDICARE

## 2024-11-04 DIAGNOSIS — E78.2 MIXED HYPERLIPIDEMIA: ICD-10-CM

## 2024-11-04 DIAGNOSIS — Z00.00 MEDICARE ANNUAL WELLNESS VISIT, SUBSEQUENT: Primary | ICD-10-CM

## 2024-11-04 DIAGNOSIS — R79.9 ABNORMAL FINDING OF BLOOD CHEMISTRY, UNSPECIFIED: ICD-10-CM

## 2024-11-04 NOTE — TELEPHONE ENCOUNTER
I have ordered the following labs. Please notify the patient.    Orders Placed This Encounter   Procedures    CBC Auto Differential     Standing Status:   Future     Standing Expiration Date:   2/4/2025    Comprehensive Metabolic Panel     Standing Status:   Future     Standing Expiration Date:   2/4/2025    Lipid Panel     Standing Status:   Future     Standing Expiration Date:   2/4/2025    TSH     Standing Status:   Future     Standing Expiration Date:   2/4/2025

## 2024-11-04 NOTE — TELEPHONE ENCOUNTER
----- Message from Asa sent at 11/1/2024  2:11 PM CDT -----  Who Called: Jaye Tinajero    Caller is requesting assistance/information from provider's office.    Symptoms (please be specific):    How long has patient had these symptoms:    List of preferred pharmacies on file (remove unneeded): [unfilled]  If different, enter pharmacy into here including location and phone number:         Patient's Preferred Phone Number on File: [phone]   Best Call Back Number, if different:  Additional Information: pt wellness visit has been r/s please make sure labs are ordered

## 2024-11-11 ENCOUNTER — LAB VISIT (OUTPATIENT)
Dept: LAB | Facility: HOSPITAL | Age: 64
End: 2024-11-11
Attending: STUDENT IN AN ORGANIZED HEALTH CARE EDUCATION/TRAINING PROGRAM
Payer: MEDICARE

## 2024-11-11 DIAGNOSIS — R79.9 ABNORMAL FINDING OF BLOOD CHEMISTRY, UNSPECIFIED: ICD-10-CM

## 2024-11-11 DIAGNOSIS — Z00.00 MEDICARE ANNUAL WELLNESS VISIT, SUBSEQUENT: ICD-10-CM

## 2024-11-11 DIAGNOSIS — E78.2 MIXED HYPERLIPIDEMIA: ICD-10-CM

## 2024-11-11 LAB
ALBUMIN SERPL-MCNC: 4.2 G/DL (ref 3.4–4.8)
ALBUMIN/GLOB SERPL: 1.3 RATIO (ref 1.1–2)
ALP SERPL-CCNC: 78 UNIT/L (ref 40–150)
ALT SERPL-CCNC: 26 UNIT/L (ref 0–55)
ANION GAP SERPL CALC-SCNC: 6 MEQ/L
AST SERPL-CCNC: 23 UNIT/L (ref 5–34)
BASOPHILS # BLD AUTO: 0.02 X10(3)/MCL
BASOPHILS NFR BLD AUTO: 0.3 %
BILIRUB SERPL-MCNC: 0.5 MG/DL
BUN SERPL-MCNC: 13.9 MG/DL (ref 9.8–20.1)
CALCIUM SERPL-MCNC: 9.7 MG/DL (ref 8.4–10.2)
CHLORIDE SERPL-SCNC: 111 MMOL/L (ref 98–107)
CHOLEST SERPL-MCNC: 114 MG/DL
CHOLEST/HDLC SERPL: 2 {RATIO} (ref 0–5)
CO2 SERPL-SCNC: 26 MMOL/L (ref 23–31)
CREAT SERPL-MCNC: 0.77 MG/DL (ref 0.55–1.02)
CREAT/UREA NIT SERPL: 18
EOSINOPHIL # BLD AUTO: 0.08 X10(3)/MCL (ref 0–0.9)
EOSINOPHIL NFR BLD AUTO: 1.2 %
ERYTHROCYTE [DISTWIDTH] IN BLOOD BY AUTOMATED COUNT: 13.4 % (ref 11.5–17)
GFR SERPLBLD CREATININE-BSD FMLA CKD-EPI: >60 ML/MIN/1.73/M2
GLOBULIN SER-MCNC: 3.2 GM/DL (ref 2.4–3.5)
GLUCOSE SERPL-MCNC: 98 MG/DL (ref 82–115)
HCT VFR BLD AUTO: 40.4 % (ref 37–47)
HDLC SERPL-MCNC: 47 MG/DL (ref 35–60)
HGB BLD-MCNC: 13.5 G/DL (ref 12–16)
IMM GRANULOCYTES # BLD AUTO: 0.02 X10(3)/MCL (ref 0–0.04)
IMM GRANULOCYTES NFR BLD AUTO: 0.3 %
LDLC SERPL CALC-MCNC: 55 MG/DL (ref 50–140)
LYMPHOCYTES # BLD AUTO: 1.55 X10(3)/MCL (ref 0.6–4.6)
LYMPHOCYTES NFR BLD AUTO: 23.5 %
MCH RBC QN AUTO: 30.5 PG (ref 27–31)
MCHC RBC AUTO-ENTMCNC: 33.4 G/DL (ref 33–36)
MCV RBC AUTO: 91.4 FL (ref 80–94)
MONOCYTES # BLD AUTO: 0.55 X10(3)/MCL (ref 0.1–1.3)
MONOCYTES NFR BLD AUTO: 8.3 %
NEUTROPHILS # BLD AUTO: 4.38 X10(3)/MCL (ref 2.1–9.2)
NEUTROPHILS NFR BLD AUTO: 66.4 %
NRBC BLD AUTO-RTO: 0 %
PLATELET # BLD AUTO: 276 X10(3)/MCL (ref 130–400)
PMV BLD AUTO: 10.2 FL (ref 7.4–10.4)
POTASSIUM SERPL-SCNC: 4.1 MMOL/L (ref 3.5–5.1)
PROT SERPL-MCNC: 7.4 GM/DL (ref 5.8–7.6)
RBC # BLD AUTO: 4.42 X10(6)/MCL (ref 4.2–5.4)
SODIUM SERPL-SCNC: 143 MMOL/L (ref 136–145)
TRIGL SERPL-MCNC: 60 MG/DL (ref 37–140)
TSH SERPL-ACNC: 2.71 UIU/ML (ref 0.35–4.94)
VLDLC SERPL CALC-MCNC: 12 MG/DL
WBC # BLD AUTO: 6.6 X10(3)/MCL (ref 4.5–11.5)

## 2024-11-11 PROCEDURE — 85025 COMPLETE CBC W/AUTO DIFF WBC: CPT

## 2024-11-11 PROCEDURE — 84443 ASSAY THYROID STIM HORMONE: CPT

## 2024-11-11 PROCEDURE — 36415 COLL VENOUS BLD VENIPUNCTURE: CPT

## 2024-11-11 PROCEDURE — 80061 LIPID PANEL: CPT

## 2024-11-11 PROCEDURE — 80053 COMPREHEN METABOLIC PANEL: CPT

## 2024-11-14 ENCOUNTER — OFFICE VISIT (OUTPATIENT)
Dept: FAMILY MEDICINE | Facility: CLINIC | Age: 64
End: 2024-11-14
Payer: MEDICARE

## 2024-11-14 VITALS
BODY MASS INDEX: 37.1 KG/M2 | RESPIRATION RATE: 20 BRPM | HEART RATE: 71 BPM | SYSTOLIC BLOOD PRESSURE: 126 MMHG | HEIGHT: 63 IN | WEIGHT: 209.38 LBS | DIASTOLIC BLOOD PRESSURE: 84 MMHG | OXYGEN SATURATION: 98 % | TEMPERATURE: 97 F

## 2024-11-14 DIAGNOSIS — D84.821 DRUG-INDUCED IMMUNODEFICIENCY: ICD-10-CM

## 2024-11-14 DIAGNOSIS — I47.10 SUPRAVENTRICULAR TACHYCARDIA: ICD-10-CM

## 2024-11-14 DIAGNOSIS — E78.2 MIXED HYPERLIPIDEMIA: ICD-10-CM

## 2024-11-14 DIAGNOSIS — Z12.11 COLON CANCER SCREENING: ICD-10-CM

## 2024-11-14 DIAGNOSIS — M06.9 RHEUMATOID ARTHRITIS, INVOLVING UNSPECIFIED SITE, UNSPECIFIED WHETHER RHEUMATOID FACTOR PRESENT: ICD-10-CM

## 2024-11-14 DIAGNOSIS — L40.50 PSORIATIC ARTHRITIS: ICD-10-CM

## 2024-11-14 DIAGNOSIS — Z79.899 DRUG-INDUCED IMMUNODEFICIENCY: ICD-10-CM

## 2024-11-14 DIAGNOSIS — Z12.31 ENCOUNTER FOR SCREENING MAMMOGRAM FOR BREAST CANCER: ICD-10-CM

## 2024-11-14 DIAGNOSIS — Z78.0 POST-MENOPAUSAL: ICD-10-CM

## 2024-11-14 DIAGNOSIS — E66.01 SEVERE OBESITY (BMI 35.0-39.9) WITH COMORBIDITY: ICD-10-CM

## 2024-11-14 DIAGNOSIS — Z00.00 WELLNESS EXAMINATION: Primary | ICD-10-CM

## 2024-11-14 DIAGNOSIS — I34.1 MITRAL VALVE PROLAPSE: ICD-10-CM

## 2024-11-14 DIAGNOSIS — M19.90 OSTEOARTHRITIS, UNSPECIFIED OSTEOARTHRITIS TYPE, UNSPECIFIED SITE: ICD-10-CM

## 2024-11-14 RX ORDER — NEEDLES, SAFETY 22GX1 1/2"
NEEDLE, DISPOSABLE MISCELLANEOUS
COMMUNITY
Start: 2024-01-02

## 2024-11-14 RX ORDER — ROSUVASTATIN CALCIUM 20 MG/1
20 TABLET, COATED ORAL DAILY
COMMUNITY
Start: 2024-10-07

## 2024-11-14 NOTE — PROGRESS NOTES
Family Medicine      Patient ID: [id number]     Chief Complaint: Medicare Annual Wellness     HPI:     Jaye Tinajero is a 64 y.o. female here today for a Medicare Annual Wellness visit and comprehensive Health Risk Assessment.     Health Maintenance         Date Due Completion Date    Shingles Vaccine (1 of 2) Never done ---    DEXA Scan Never done ---    RSV Vaccine (Age 60+ and Pregnant patients) (1 - Risk 60-74 years 1-dose series) Never done ---    Mammogram 09/20/2023 9/20/2022    Colorectal Cancer Screening 01/04/2025 1/4/2022    COVID-19 Vaccine (4 - 2024-25 season) 01/06/2025 11/11/2024    Hemoglobin A1c (Diabetic Prevention Screening) 09/04/2027 9/4/2024    Lipid Panel 11/11/2029 11/11/2024    TETANUS VACCINE 06/03/2031 6/3/2021        Labs up to date  MMG DEXA due and ordered  Cologuard due 2/2025 and ordered     Past Medical History:   Diagnosis Date    Arrhythmia     Drug-induced immunodeficiency 11/14/2024    Insomnia     Mitral valve prolapse 06/22/2022    Mixed hyperlipidemia 06/22/2022    MVP (mitral valve prolapse)     OA (osteoarthritis)     Obesity     Osteoarthritis 06/22/2022    Psoriatic arthritis 06/26/2023    RA (rheumatoid arthritis)     Supraventricular tachycardia 11/14/2024        Past Surgical History:   Procedure Laterality Date    APPENDECTOMY  1980?    Had gallbladder surgery took appendix out    CHOLECYSTECTOMY  1980?        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   Tobacco Use    Smoking status: Former    Smokeless tobacco: Never   Substance and Sexual Activity    Alcohol use: Yes     Comment: Socially    Drug use: Never     Social Drivers of Health     Financial Resource Strain: Low Risk  (11/13/2024)    Overall Financial Resource Strain (CARDIA)     Difficulty of Paying Living Expenses: Not hard at all   Food Insecurity: No Food Insecurity (11/13/2024)    Hunger Vital Sign      "Worried About Running Out of Food in the Last Year: Never true     Ran Out of Food in the Last Year: Never true   Physical Activity: Insufficiently Active (11/13/2024)    Exercise Vital Sign     Days of Exercise per Week: 3 days     Minutes of Exercise per Session: 10 min   Stress: No Stress Concern Present (11/13/2024)    [employer] - Occupational Stress Questionnaire     Feeling of Stress : Not at all   Housing Stability: Unknown (11/13/2024)    Housing Stability Vital Sign     Unable to Pay for Housing in the Last Year: No        Family History   Problem Relation Name Age of Onset    Arthritis Mother Jayla     Asthma Mother Jayla     Diabetes Mother Jayla     Vision loss Mother Jayla     COPD Father Jerome     Hearing loss Father [place]     Heart disease Father [place]     Hypertension Father Jerome     Stroke Father Jerome         Current Outpatient Medications   Medication Instructions    folic acid (FOLVITE) 1,000 mcg, Daily    hydroCHLOROthiazide (HYDRODIURIL) 25 mg, Daily    methotrexate 25 mg/mL injection 0.8 mLs, Every 7 days    metoprolol succinate (TOPROL-XL) 100 mg, Daily    risankizumab-rzaa (SKYRIZI SUBQ) Inject into the skin.    rosuvastatin (CRESTOR) 20 mg, Daily    syringe with needle (BD SAFETYGLIDE TB REG BEVEL) 1 mL 27 x 1/2" Syrg See Admin Instructions.       Review of patient's allergies indicates:   Allergen Reactions    Sulfa (sulfonamide antibiotics) Hallucinations        Immunization History   Administered Date(s) Administered    COVID-19 MRNA, LN-S PF (MODERNA HALF 0.25 ML DOSE) 10/26/2021, 07/05/2022    COVID-19, MRNA, LN-S, PF (MODERNA FULL 0.5 ML DOSE) 03/01/2021, 03/29/2021    COVID-19, mRNA, LNP-S, PF (Moderna) Ages 12+ 11/11/2024    Influenza - Quadrivalent - MDCK - PF 02/04/2020, 10/07/2021, 11/28/2023    Influenza - Quadrivalent - PF *Preferred* (6 months and older) 12/16/2017, 08/29/2018, 09/30/2020, 10/31/2022    Influenza - Trivalent - Fluad - " "Adjuvanted - PF (65 years and older 11/11/2024    Influenza - Trivalent - Fluarix, Flulaval, Fluzone, Afluria - PF 01/31/2017, 09/30/2020, 10/07/2021    Pneumococcal Conjugate - 20 Valent 06/22/2022    Tdap 06/03/2021        Patient Care Team:  Magalie Garvin DO as PCP - General (Family Medicine)  Robert Zacarias MD as Consulting Physician (Cardiovascular Disease)  Jenny Rice MD as Consulting Physician (Rheumatology)    Subjective:     Review of Systems   Constitutional: Negative.    HENT: Negative.     Eyes: Negative.    Respiratory: Negative.     Cardiovascular: Negative.    Gastrointestinal: Negative.    Endocrine: Negative.    Genitourinary: Negative.    Musculoskeletal: Negative.    Skin: Negative.    Neurological: Negative.    Psychiatric/Behavioral: Negative.     All other systems reviewed and are negative.      12 point review of systems conducted, negative except as stated in the history of present illness. See HPI for details.    Objective:     Visit Vitals  /84 (BP Location: Right arm, Patient Position: Sitting)   Pulse 71   Temp 97.3 °F (36.3 °C) (Oral)   Resp 20   Ht 5' 3" (1.6 m)   Wt 95 kg (209 lb 6.4 oz)   SpO2 98%   BMI 37.09 kg/m²       Physical Exam  Vitals reviewed.   Constitutional:       Appearance: Normal appearance.   HENT:      Head: Normocephalic.      Mouth/Throat:      Mouth: Mucous membranes are moist.   Eyes:      Conjunctiva/sclera: Conjunctivae normal.      Pupils: Pupils are equal, round, and reactive to light.   Cardiovascular:      Rate and Rhythm: Normal rate and regular rhythm.   Pulmonary:      Effort: Pulmonary effort is normal. No respiratory distress.      Breath sounds: Normal breath sounds.   Abdominal:      General: There is no distension.   Musculoskeletal:         General: Normal range of motion.      Cervical back: Normal range of motion and neck supple.   Skin:     General: Skin is warm and dry.   Neurological:      Mental Status: She is alert and " oriented to person, place, and time.   Psychiatric:         Mood and Affect: Mood normal.         Assessment:       ICD-10-CM ICD-9-CM   1. Wellness examination  Z00.00 V70.0   2. Encounter for screening mammogram for breast cancer  Z12.31 V76.12   3. Drug-induced immunodeficiency  D84.821 279.3    Z79.899 E947.9   4. Supraventricular tachycardia  I47.10 427.89   5. Severe obesity (BMI 35.0-39.9) with comorbidity  E66.01 278.01   6. Mitral valve prolapse  I34.1 424.0   7. Rheumatoid arthritis, involving unspecified site, unspecified whether rheumatoid factor present  M06.9 714.0   8. Psoriatic arthritis  L40.50 696.0   9. Osteoarthritis, unspecified osteoarthritis type, unspecified site  M19.90 715.90   10. Mixed hyperlipidemia  E78.2 272.2   11. Post-menopausal  Z78.0 V49.81   12. Colon cancer screening  Z12.11 V76.51        Plan:     1. Wellness examination  Assessment & Plan:  Labs up to date  MMG DEXA due and ordered  Cologuard due 2/2025 and ordered       2. Encounter for screening mammogram for breast cancer  -     Cancel: Mammo Digital Screening Bilat w/ Alexandre; Future; Expected date: 11/14/2024  -     Mammo Digital Screening Bilat w/ Alexandre; Future; Expected date: 11/14/2024    3. Drug-induced immunodeficiency  Assessment & Plan:  Stable  Continue current meds (Methotrexate)  Keep appt with Rheumatology for follow up      4. Supraventricular tachycardia  Assessment & Plan:  Stable  Keep appt with Cardiology for follow up        5. Severe obesity (BMI 35.0-39.9) with comorbidity  Assessment & Plan:  Aerobic exercise as tolerated   Healthy eating habits discussed      6. Mitral valve prolapse  Assessment & Plan:  Stable  Keep appt with Cardiology for follow up        7. Rheumatoid arthritis, involving unspecified site, unspecified whether rheumatoid factor present  Assessment & Plan:  Stable  Continue current meds (Methotrexate)  Keep appt with Rheumatology for follow up        8. Psoriatic arthritis  Assessment &  Plan:  Stable  Continue current meds (Skyrizi)  Keep appt with Rheumatology for follow up      9. Osteoarthritis, unspecified osteoarthritis type, unspecified site  Assessment & Plan:  Stable  Keep appt with PCP for follow up        10. Mixed hyperlipidemia  Assessment & Plan:  Stable  Continue current meds (Statin)  Keep appt with PCP for follow up        11. Post-menopausal  -     DXA Bone Density Axial Skeleton 1 or more sites; Future; Expected date: 11/14/2024    12. Colon cancer screening  -     Cologuard Screening (Multitarget Stool DNA); Future; Expected date: 02/14/2025         A comprehensive HEALTH RISK ASSESSMENT was completed today. Results are summarized below:    There are NO EMOTIONAL/SOCIAL CONCERNS identified on today's screening for Social Isolation, Depression and Anxiety.    There are NO COGNITIVE FUNCTION CONCERNS identified on today's screening.  The following FUNCTIONAL AND/OR SAFETY CONCERNS were identified on today's screening for Physical Symptoms, Nutritional, Home Safety/Living Situation, Fall Risk, Activities of Daily Living, Independent Activities of Daily Living, Physical Activity,Timed Up and Go test and Whisper test::   *Patient reports a history of FRACTURE (other than fingers) IN THE PAST YEAR. (Have you broken any bones (other than fingers) in the past year?: (!) Yes (Toe))     The patient reports NO OPIOID PRESCRIPTIONS. This was confirmed through medication reconciliation and the [facility] website.    The patient is NOT A TOBACCO USER.  The patient reports NO SIGNIFICANT ALCOHOL USE.     All Questions regarding food, transportation or housing were not answered today.    The patient was asked and declined the use of a free .    Advance Care Planning   Today we discussed advance care planning. She is interested in learning more about how to make Advance Directives. Information was provided and I offered to discuss more at her discretion.         Provided patient with a 5-10  year written screening schedule and personal prevention plan. Recommendations were developed using the USPSTF age appropriate recommendations. Education, counseling, and referrals were provided as needed. After Visit Summary printed and given to patient, which includes a list of additional screenings\tests needed.    Follow up in about 6 months (around 5/14/2025). In addition to their scheduled follow up, the patient has also been instructed to follow up on as needed basis.     Future Appointments   Date Time Provider Department Center   5/13/2025 10:30 AM Magalie Garvin, DO [facility] NP

## 2025-01-06 ENCOUNTER — TELEPHONE (OUTPATIENT)
Dept: FAMILY MEDICINE | Facility: CLINIC | Age: 65
End: 2025-01-06
Payer: MEDICARE

## 2025-01-06 NOTE — TELEPHONE ENCOUNTER
Pt is going on a cruise at the end of February and would like medication for sea sickness. Pt notified Dr. Garvin is out of the office until Monday 1- and message will be left in the in basket to be completed on her return. Pt verbalized understanding.

## 2025-01-06 NOTE — TELEPHONE ENCOUNTER
----- Message from Danita sent at 1/6/2025  1:05 PM CST -----  Regarding: advice  Who Called: Jaye Tinajero    Caller is requesting assistance/information from provider's office.    Symptoms (please be specific):    How long has patient had these symptoms:    List of preferred pharmacies on file (remove unneeded): [unfilled]  If different, enter pharmacy into here including location and phone number:       Preferred Method of Contact: Phone Call  Patient's Preferred Phone Number on File: [phone]   Best Call Back Number, if different:    Additional Information: stated that she is going on a cruise soon, and would like to speak to the nurse about getting meds for sea sickness. Requested a call back. Please advise

## 2025-01-13 ENCOUNTER — OFFICE VISIT (OUTPATIENT)
Dept: FAMILY MEDICINE | Facility: CLINIC | Age: 65
End: 2025-01-13
Payer: MEDICARE

## 2025-01-13 DIAGNOSIS — T75.3XXA MOTION SICKNESS, INITIAL ENCOUNTER: Primary | ICD-10-CM

## 2025-01-13 PROCEDURE — 98005 SYNCH AUDIO-VIDEO EST LOW 20: CPT | Mod: 95,,, | Performed by: NURSE PRACTITIONER

## 2025-01-13 RX ORDER — SCOLOPAMINE TRANSDERMAL SYSTEM 1 MG/1
1 PATCH, EXTENDED RELEASE TRANSDERMAL
Qty: 4 PATCH | Refills: 0 | Status: SHIPPED | OUTPATIENT
Start: 2025-01-13

## 2025-01-13 NOTE — ASSESSMENT & PLAN NOTE
Rx Scopolamine patch  Apply 4 hours prior to travel; replace q 72 hours  May cause drowsiness, dry mouth, sedation, blurred vision  F/U for any problems and/or concerns  Verbalizes all understanding

## 2025-01-13 NOTE — PROGRESS NOTES
Subjective:      Patient ID: Jaye Tinajero is a 64 y.o. White female      Chief Complaint: Follow-up (Patient requesting nausea meds for cruise on feb 13 )     This is a telemedicine note. Patient was treated using telemedicine, real-time audio and video. I, distant provider, conducted the visit from location identified below. The patient participated in the visit at a non- [facility] location selected by the patient identified below. I am licensed in the state where the patient stated they are located. The patient stated that they understood and accepted the privacy and security risks to their information at their location.  Patient was located at home.  I, distant provider, was [facility]      Past Medical History:   Diagnosis Date    Arrhythmia     Drug-induced immunodeficiency 11/14/2024    Insomnia     Mitral valve prolapse 06/22/2022    Mixed hyperlipidemia 06/22/2022    MVP (mitral valve prolapse)     OA (osteoarthritis)     Obesity     Osteoarthritis 06/22/2022    Psoriatic arthritis 06/26/2023    RA (rheumatoid arthritis)     Supraventricular tachycardia 11/14/2024        HPI  Presents to clinic for motion sickness medication.      Pt will be going on cruise in February.  She is requesting medication for motion sickness.  States motion sickness in the past while riding in car and reading.  Denies any other problems.          Patient Care Team:  Magalie Garvin DO as PCP - General (Family Medicine)  Robert Zacarias MD as Consulting Physician (Cardiovascular Disease)  Jenny Rice MD as Consulting Physician (Rheumatology)      Review of Systems   Constitutional:  Negative for activity change and unexpected weight change.   HENT:  Negative for hearing loss, rhinorrhea and trouble swallowing.    Eyes:  Negative for discharge and visual disturbance.   Respiratory:  Negative for chest tightness and wheezing.    Cardiovascular:  Negative for chest pain and palpitations.  "  Gastrointestinal:  Negative for blood in stool, constipation, diarrhea and vomiting.   Endocrine: Negative for polydipsia and polyuria.   Genitourinary:  Negative for difficulty urinating, dysuria, hematuria and menstrual problem.   Musculoskeletal:  Negative for arthralgias, joint swelling and neck pain.   Neurological:  Negative for weakness and headaches.   Psychiatric/Behavioral:  Negative for confusion and dysphoric mood.            Objective:      There were no vitals filed for this visit.   There is no height or weight on file to calculate BMI.     Physical Exam  Constitutional:       Appearance: Normal appearance.   Abdominal:      General: There is no distension.   Neurological:      Mental Status: She is alert and oriented to person, place, and time.            Current Outpatient Medications:     folic acid (FOLVITE) 1 MG tablet, Take 1,000 mcg by mouth once daily., Disp: , Rfl:     hydroCHLOROthiazide (HYDRODIURIL) 25 MG tablet, Take 25 mg by mouth once daily., Disp: , Rfl:     methotrexate 25 mg/mL injection, Inject 0.8 mLs into the skin every 7 days., Disp: , Rfl:     metoprolol succinate (TOPROL-XL) 100 MG 24 hr tablet, Take 100 mg by mouth once daily., Disp: , Rfl:     risankizumab-rzaa (SKYRIZI SUBQ), Inject into the skin., Disp: , Rfl:     rosuvastatin (CRESTOR) 20 MG tablet, Take 20 mg by mouth once daily., Disp: , Rfl:     syringe with needle (BD SAFETYGLIDE TB REG BEVEL) 1 mL 27 x 1/2" Syrg, See Admin Instructions., Disp: , Rfl:     scopolamine (TRANSDERM-SCOP) 1.3-1.5 mg (1 mg over 3 days), Place 1 patch onto the skin every 72 hours., Disp: 4 patch, Rfl: 0    Assessment & Plan:     Problem List Items Addressed This Visit          Other    Motion sickness - Primary     Rx Scopolamine patch  Apply 4 hours prior to travel; replace q 72 hours  May cause drowsiness, dry mouth, sedation, blurred vision  F/U for any problems and/or concerns  Verbalizes all understanding         Relevant Medications "    scopolamine (TRANSDERM-SCOP) 1.3-1.5 mg (1 mg over 3 days)

## 2025-01-22 ENCOUNTER — PATIENT OUTREACH (OUTPATIENT)
Facility: CLINIC | Age: 65
End: 2025-01-22
Payer: MEDICARE

## 2025-01-22 ENCOUNTER — PATIENT MESSAGE (OUTPATIENT)
Dept: ADMINISTRATIVE | Facility: HOSPITAL | Age: 65
End: 2025-01-22
Payer: MEDICARE

## 2025-01-22 NOTE — PROGRESS NOTES
Health Maintenance Topic(s) Outreach Outcomes & Actions Taken:    Breast Cancer Screening - Outreach Outcomes & Actions Taken  : Mammogram Order Placed    Osteoporosis Screening - Outreach Outcomes & Actions Taken  : Dexa Order Placed         Additional Notes:  Campaign reply: Dexa Scan and Mammogram ordered 11/2024

## 2025-02-12 ENCOUNTER — TELEPHONE (OUTPATIENT)
Dept: FAMILY MEDICINE | Facility: CLINIC | Age: 65
End: 2025-02-12
Payer: MEDICARE

## 2025-02-12 NOTE — TELEPHONE ENCOUNTER
Was addressed with patient and patient advised by office that the secondary insurance doesn't cover the deductible of medicare (primary). Patient voiced understanding. Leatha

## 2025-02-19 ENCOUNTER — HOSPITAL ENCOUNTER (OUTPATIENT)
Dept: RADIOLOGY | Facility: HOSPITAL | Age: 65
Discharge: HOME | End: 2025-02-19
Attending: NURSE PRACTITIONER
Payer: MEDICARE

## 2025-02-19 DIAGNOSIS — Z12.31 OTHER SCREENING MAMMOGRAM: ICD-10-CM

## 2025-02-19 DIAGNOSIS — Z12.31 ENCOUNTER FOR SCREENING MAMMOGRAM FOR BREAST CANCER: ICD-10-CM

## 2025-02-19 PROCEDURE — 77063 BREAST TOMOSYNTHESIS BI: CPT | Mod: TC

## 2025-02-20 ENCOUNTER — RESULTS FOLLOW-UP (OUTPATIENT)
Dept: FAMILY MEDICINE | Facility: CLINIC | Age: 65
End: 2025-02-20
Payer: MEDICARE

## 2025-02-26 ENCOUNTER — RESULTS FOLLOW-UP (OUTPATIENT)
Dept: FAMILY MEDICINE | Facility: CLINIC | Age: 65
End: 2025-02-26
Payer: MEDICARE

## 2025-02-26 ENCOUNTER — HOSPITAL ENCOUNTER (OUTPATIENT)
Dept: RADIOLOGY | Facility: HOSPITAL | Age: 65
Discharge: HOME | End: 2025-02-26
Attending: NURSE PRACTITIONER
Payer: MEDICARE

## 2025-02-26 DIAGNOSIS — M81.0 AGE-RELATED OSTEOPOROSIS WITHOUT CURRENT PATHOLOGICAL FRACTURE: Primary | ICD-10-CM

## 2025-02-26 DIAGNOSIS — M81.0 OSTEOPOROSIS, UNSPECIFIED OSTEOPOROSIS TYPE, UNSPECIFIED PATHOLOGICAL FRACTURE PRESENCE: Primary | ICD-10-CM

## 2025-02-26 DIAGNOSIS — M81.0 AGE-RELATED OSTEOPOROSIS WITHOUT CURRENT PATHOLOGICAL FRACTURE: ICD-10-CM

## 2025-02-26 PROCEDURE — 77080 DXA BONE DENSITY AXIAL: CPT | Mod: TC

## 2025-02-27 RX ORDER — ALENDRONATE SODIUM 70 MG/1
70 TABLET ORAL
Qty: 4 TABLET | Refills: 11 | Status: SHIPPED | OUTPATIENT
Start: 2025-02-27 | End: 2026-02-27

## 2025-03-11 ENCOUNTER — TELEPHONE (OUTPATIENT)
Dept: FAMILY MEDICINE | Facility: CLINIC | Age: 65
End: 2025-03-11
Payer: MEDICARE

## 2025-03-11 NOTE — TELEPHONE ENCOUNTER
----- Message from Renata sent at 3/7/2025  2:05 PM CST -----  Who Called: Jaye Singh is requesting assistance/information from provider's office.Symptoms (please be specific): n/a How long has patient had these symptoms:  n/aList of preferred pharmacies on file (remove unneeded): n/aPreferred Method of Contact: Phone CallPatient's Preferred Phone Number on File: [phone] Best Call Back Number, if different:Additional Information: Pt requesting for bone density results to be sent to Dr. Jenny Rice

## 2025-05-13 ENCOUNTER — OFFICE VISIT (OUTPATIENT)
Dept: FAMILY MEDICINE | Facility: CLINIC | Age: 65
End: 2025-05-13
Payer: MEDICARE

## 2025-05-13 VITALS
OXYGEN SATURATION: 99 % | DIASTOLIC BLOOD PRESSURE: 82 MMHG | TEMPERATURE: 98 F | HEART RATE: 79 BPM | RESPIRATION RATE: 16 BRPM | HEIGHT: 63 IN | WEIGHT: 204.38 LBS | SYSTOLIC BLOOD PRESSURE: 134 MMHG | BODY MASS INDEX: 36.21 KG/M2

## 2025-05-13 DIAGNOSIS — I34.1 MITRAL VALVE PROLAPSE: ICD-10-CM

## 2025-05-13 DIAGNOSIS — I49.9 CARDIAC ARRHYTHMIA, UNSPECIFIED CARDIAC ARRHYTHMIA TYPE: ICD-10-CM

## 2025-05-13 DIAGNOSIS — E78.2 MIXED HYPERLIPIDEMIA: ICD-10-CM

## 2025-05-13 DIAGNOSIS — M79.89 SWELLING OF LOWER EXTREMITY: ICD-10-CM

## 2025-05-13 DIAGNOSIS — M19.90 OSTEOARTHRITIS, UNSPECIFIED OSTEOARTHRITIS TYPE, UNSPECIFIED SITE: ICD-10-CM

## 2025-05-13 DIAGNOSIS — M06.9 RHEUMATOID ARTHRITIS, INVOLVING UNSPECIFIED SITE, UNSPECIFIED WHETHER RHEUMATOID FACTOR PRESENT: Primary | ICD-10-CM

## 2025-05-13 DIAGNOSIS — M81.0 AGE-RELATED OSTEOPOROSIS WITHOUT CURRENT PATHOLOGICAL FRACTURE: ICD-10-CM

## 2025-05-13 DIAGNOSIS — L40.50 PSORIATIC ARTHRITIS: ICD-10-CM

## 2025-05-13 PROBLEM — T75.3XXA MOTION SICKNESS: Status: RESOLVED | Noted: 2025-01-13 | Resolved: 2025-05-13

## 2025-05-13 PROBLEM — Z00.00 WELLNESS EXAMINATION: Status: RESOLVED | Noted: 2024-11-14 | Resolved: 2025-05-13

## 2025-05-13 PROCEDURE — 99214 OFFICE O/P EST MOD 30 MIN: CPT | Mod: ,,, | Performed by: STUDENT IN AN ORGANIZED HEALTH CARE EDUCATION/TRAINING PROGRAM

## 2025-05-13 NOTE — ASSESSMENT & PLAN NOTE
"- DEXA positive for osteoporosis from 02/26/25.   - She's been taking Fosamax on Mondays and has been taking Fosamax for a little over a month, but feels like she's developed, "IBS symptoms," with abdominal pain, nausea, and diarrhea.   - She would like to switch to Prolia.   - Continue calcium and vitamin D supplementation.  "

## 2025-05-13 NOTE — ASSESSMENT & PLAN NOTE
"- Patient following with Dr. Robert Zacarias with Cardiology.  - She is taking Metoprolol daily.  - Patient denies any chest pain, shortness of breath, or any syncopal episodes.  - She endorses palpitations, "here and there," but states she's been doing well overall.  "

## 2025-05-13 NOTE — ASSESSMENT & PLAN NOTE
- Stable.  - Patient following with Dr. Jenny Rice with Rheumatology.  - Patient taking folic acid, Methotrexate weekly, and Skyrizi.

## 2025-05-13 NOTE — PROGRESS NOTES
"Subjective:      Patient ID: Jaye Tinajero is a 65 y.o. female.    Chief Complaint: Chronic Medical Management    RA/Psoriatic Arthritis/OA: Patient following with Dr. Jenny Rice with Rheumatology. Patient taking folic acid, Methotrexate weekly, and Skyrizi.     Mitral Valve Prolapse/Arrhythmia: Patient following with Dr. Robert Zacarias with Cardiology. She is taking Metoprolol daily. Patient denies any chest pain, shortness of breath, or any syncopal episodes. She endorses palpitations, "here and there," but states she's been doing well overall.    Lower Extremity Swelling: Patient takes HCTZ PRN.     Osteoporosis: DEXA positive for osteoporosis from 02/26/25. She's been taking Fosamax on Mondays and has been taking Fosamax for a little over a month, but feels like she's developed, "IBS symptoms," with abdominal pain, nausea, and diarrhea. She would like to switch to Prolia. She states compliance with calcium and vitamin D supplementation.    Preventative Health: Medicare Wellness completed on 11/14/24.    Review of Systems   Constitutional:  Positive for fatigue. Negative for activity change, appetite change, fever and unexpected weight change.   Eyes:  Negative for visual disturbance.   Respiratory:  Negative for apnea, cough and shortness of breath.    Cardiovascular:  Positive for palpitations and leg swelling. Negative for chest pain.   Gastrointestinal:  Positive for abdominal pain, diarrhea and nausea. Negative for blood in stool, constipation and vomiting.   Genitourinary:  Negative for dysuria and hematuria.   Musculoskeletal:  Positive for arthralgias and joint swelling. Negative for back pain and myalgias.   Skin:  Negative for rash and wound.   Neurological:  Negative for dizziness, weakness, numbness and headaches.   Psychiatric/Behavioral:  Negative for behavioral problems, dysphoric mood and sleep disturbance. The patient is not nervous/anxious.      Objective:   /82 (BP Location: Left " "arm, Patient Position: Sitting)   Pulse 79   Temp 98.2 °F (36.8 °C) (Oral)   Resp 16   Ht 5' 3" (1.6 m)   Wt 92.7 kg (204 lb 6.4 oz)   SpO2 99%   BMI 36.21 kg/m²     Physical Exam  Vitals and nursing note reviewed.   Constitutional:       General: She is not in acute distress.     Appearance: Normal appearance. She is obese. She is not ill-appearing or toxic-appearing.   HENT:      Head: Normocephalic and atraumatic.   Eyes:      Conjunctiva/sclera: Conjunctivae normal.   Cardiovascular:      Rate and Rhythm: Normal rate and regular rhythm.      Heart sounds: Normal heart sounds. No murmur heard.  Pulmonary:      Effort: Pulmonary effort is normal. No respiratory distress.      Breath sounds: Normal breath sounds.   Abdominal:      General: There is no distension.      Palpations: Abdomen is soft.      Tenderness: There is no abdominal tenderness.   Musculoskeletal:         General: Swelling (bilateral fingers) present. No tenderness, deformity or signs of injury. Normal range of motion.      Right lower leg: No edema.      Left lower leg: No edema.   Skin:     General: Skin is warm and dry.      Findings: No lesion or rash.   Neurological:      General: No focal deficit present.      Mental Status: She is alert. Mental status is at baseline.      Motor: No weakness.      Coordination: Coordination normal.   Psychiatric:         Mood and Affect: Mood normal.         Behavior: Behavior normal.         Thought Content: Thought content normal.         Judgment: Judgment normal.       Assessment/Plan:   1. Rheumatoid arthritis, involving unspecified site, unspecified whether rheumatoid factor present  Assessment & Plan:  - Stable.  - Patient following with Dr. Jenny Rice with Rheumatology.  - Patient taking folic acid, Methotrexate weekly, and Skyrizi.       2. Psoriatic arthritis  Assessment & Plan:  - Refer to RA plan.      3. Osteoarthritis, unspecified osteoarthritis type, unspecified site  Assessment & " "Plan:  - Refer to RA plan.      4. Mitral valve prolapse  Assessment & Plan:  - Patient following with Dr. Robert Zacarias with Cardiology.  - She is taking Metoprolol daily.  - Patient denies any chest pain, shortness of breath, or any syncopal episodes.  - She endorses palpitations, "here and there," but states she's been doing well overall.      5. Cardiac arrhythmia, unspecified cardiac arrhythmia type  Assessment & Plan:  - Refer to Mitral valve prolapse plan.      6. Mixed hyperlipidemia  Assessment & Plan:  - Stable.  - Continue Crestor 20mg daily.      7. Swelling of lower extremity  Assessment & Plan:  - Stable.  - Patient takes HCTZ PRN.      8. Age-related osteoporosis without current pathological fracture  Assessment & Plan:  - DEXA positive for osteoporosis from 02/26/25.   - She's been taking Fosamax on Mondays and has been taking Fosamax for a little over a month, but feels like she's developed, "IBS symptoms," with abdominal pain, nausea, and diarrhea.   - She would like to switch to Prolia.   - Continue calcium and vitamin D supplementation.      Other orders  -     denosumab (PROLIA) injection 60 mg       Follow up in about 6 months (around 11/13/2025) for Medicare Wellness.                "

## 2025-05-15 ENCOUNTER — TELEPHONE (OUTPATIENT)
Dept: FAMILY MEDICINE | Facility: CLINIC | Age: 65
End: 2025-05-15
Payer: MEDICARE

## 2025-05-15 DIAGNOSIS — R74.01 TRANSAMINITIS: Primary | ICD-10-CM

## 2025-05-15 NOTE — TELEPHONE ENCOUNTER
Reviewed outside labs from 05/13/25.    Labs positive for elevated ALT/AST 57/45 (liver enzymes). Patient will need to avoid Tylenol, alcohol, and any herbal remedies. Would patient like to pursue work-up with further labs/abdominal US or does she want to wait 1 month and repeat CMP to re-evaluation liver enzymes?

## 2025-05-15 NOTE — TELEPHONE ENCOUNTER
I have ordered CMP for patient to repeat in 1 month for re-evaluation. She will need to avoid Tylenol, alcohol, and any herbal remedies (i-e.- teas). Please notify the patient.    Orders Placed This Encounter   Procedures    Comprehensive Metabolic Panel     Standing Status:   Future     Expected Date:   5/15/2025     Expiration Date:   8/15/2025

## 2025-05-16 ENCOUNTER — TELEPHONE (OUTPATIENT)
Dept: FAMILY MEDICINE | Facility: CLINIC | Age: 65
End: 2025-05-16
Payer: MEDICARE

## 2025-05-16 NOTE — TELEPHONE ENCOUNTER
Copied from CRM #[id number]. Topic: General Inquiry - Patient Advice  >> May 16, 2025  8:42 AM Desiree wrote:  .Who Called: Jaye Tinajero        Preferred Method of Contact: Phone Call  Patient's Preferred Phone Number on File: [phone]   Best Call Back Number, if different:  Additional Information: pt asking for nurse to call her about her vitamin d

## 2025-05-16 NOTE — TELEPHONE ENCOUNTER
I spoke with patient and she stated she was informed to stop the herbal remedies. Patient asked if she should continue calcium and vitamin d. Patient advised per Dr. Garvin verbal order ok to continue taking. She also asked if she could take vitamin c. Patient advised per Dr. Garvin verbal order ok to take vitamin c. She voiced understanding. Leatha

## 2025-05-28 ENCOUNTER — INFUSION (OUTPATIENT)
Dept: INFUSION THERAPY | Facility: HOSPITAL | Age: 65
End: 2025-05-28
Attending: STUDENT IN AN ORGANIZED HEALTH CARE EDUCATION/TRAINING PROGRAM
Payer: MEDICARE

## 2025-05-28 VITALS
BODY MASS INDEX: 36.08 KG/M2 | RESPIRATION RATE: 18 BRPM | OXYGEN SATURATION: 97 % | HEIGHT: 63 IN | HEART RATE: 60 BPM | WEIGHT: 203.63 LBS | SYSTOLIC BLOOD PRESSURE: 108 MMHG | DIASTOLIC BLOOD PRESSURE: 57 MMHG | TEMPERATURE: 98 F

## 2025-05-28 DIAGNOSIS — M81.0 AGE-RELATED OSTEOPOROSIS WITHOUT CURRENT PATHOLOGICAL FRACTURE: Primary | ICD-10-CM

## 2025-05-28 PROCEDURE — 63600175 PHARM REV CODE 636 W HCPCS: Mod: JZ,TB | Performed by: STUDENT IN AN ORGANIZED HEALTH CARE EDUCATION/TRAINING PROGRAM

## 2025-05-28 PROCEDURE — 96372 THER/PROPH/DIAG INJ SC/IM: CPT

## 2025-05-28 RX ADMIN — DENOSUMAB 60 MG: 60 INJECTION SUBCUTANEOUS at 10:05

## 2025-05-28 NOTE — NURSING
Pt denied recent and upcoming invasive dental procedures. Prolia injection given, tolerated well. Pt monitored x 15 minutes, discharged home in stable condition, future appts and educational materials given.

## 2025-07-10 ENCOUNTER — TELEPHONE (OUTPATIENT)
Dept: FAMILY MEDICINE | Facility: CLINIC | Age: 65
End: 2025-07-10
Payer: MEDICARE

## 2025-07-10 DIAGNOSIS — R74.01 ELEVATED ALT MEASUREMENT: Primary | ICD-10-CM

## 2025-07-10 NOTE — TELEPHONE ENCOUNTER
Received hepatic function panel patient had completed on 07/07/25. ALT was 51 and elevated based on the lab's reference range. As a precaution, patient will need to avoid Tylenol, alcohol, and any herbal remedies.    Patient's ALT was 26, within normal range from 11/11/24. Have patient present for repeat CMP for re-evaluation.   no

## 2025-07-10 NOTE — TELEPHONE ENCOUNTER
Pt stated Dr. Rice believes it is the pt's methotrexate. The dosage was lowered from 8 mg to 6 mg.  Pt advised to repeat CMP per Dr. Garvin on Monday.  Pt verbalized understanding.

## 2025-07-14 ENCOUNTER — LAB VISIT (OUTPATIENT)
Dept: LAB | Facility: HOSPITAL | Age: 65
End: 2025-07-14
Attending: STUDENT IN AN ORGANIZED HEALTH CARE EDUCATION/TRAINING PROGRAM
Payer: MEDICARE

## 2025-07-14 DIAGNOSIS — R74.01 ELEVATED ALT MEASUREMENT: ICD-10-CM

## 2025-07-14 LAB
ALBUMIN SERPL-MCNC: 3.9 G/DL (ref 3.4–4.8)
ALBUMIN/GLOB SERPL: 1.1 RATIO (ref 1.1–2)
ALP SERPL-CCNC: 71 UNIT/L (ref 40–150)
ALT SERPL-CCNC: 50 UNIT/L (ref 0–55)
ANION GAP SERPL CALC-SCNC: 6 MEQ/L
AST SERPL-CCNC: 29 UNIT/L (ref 11–45)
BILIRUB SERPL-MCNC: 0.6 MG/DL
BUN SERPL-MCNC: 16.2 MG/DL (ref 9.8–20.1)
CALCIUM SERPL-MCNC: 9.2 MG/DL (ref 8.4–10.2)
CHLORIDE SERPL-SCNC: 111 MMOL/L (ref 98–107)
CO2 SERPL-SCNC: 25 MMOL/L (ref 23–31)
CREAT SERPL-MCNC: 0.8 MG/DL (ref 0.55–1.02)
CREAT/UREA NIT SERPL: 20
GFR SERPLBLD CREATININE-BSD FMLA CKD-EPI: >60 ML/MIN/1.73/M2
GLOBULIN SER-MCNC: 3.7 GM/DL (ref 2.4–3.5)
GLUCOSE SERPL-MCNC: 83 MG/DL (ref 82–115)
POTASSIUM SERPL-SCNC: 4.4 MMOL/L (ref 3.5–5.1)
PROT SERPL-MCNC: 7.6 GM/DL (ref 5.8–7.6)
SODIUM SERPL-SCNC: 142 MMOL/L (ref 136–145)

## 2025-07-14 PROCEDURE — 80053 COMPREHEN METABOLIC PANEL: CPT

## 2025-07-14 PROCEDURE — 36415 COLL VENOUS BLD VENIPUNCTURE: CPT

## 2025-07-15 ENCOUNTER — TELEPHONE (OUTPATIENT)
Dept: FAMILY MEDICINE | Facility: CLINIC | Age: 65
End: 2025-07-15
Payer: MEDICARE

## 2025-07-15 NOTE — TELEPHONE ENCOUNTER
Copied from CRM #[id number]. Topic: General Inquiry - Patient Advice  >> Jul 15, 2025 10:38 AM Asa wrote:  Who Called: Jaye Tinajero    Patient is returning phone call    Who Left Message for Patient:Irina  Does the patient know what this is regarding?:results      Preferred Method of Contact: Phone Call  Patient's Preferred Phone Number on File: [phone]   Best Call Back Number, if different:  Additional Information: